# Patient Record
Sex: FEMALE | Race: WHITE | NOT HISPANIC OR LATINO
[De-identification: names, ages, dates, MRNs, and addresses within clinical notes are randomized per-mention and may not be internally consistent; named-entity substitution may affect disease eponyms.]

---

## 2018-03-13 ENCOUNTER — RX ONLY (RX ONLY)
Age: 58
End: 2018-03-13

## 2021-07-12 ENCOUNTER — OFFICE VISIT (OUTPATIENT)
Dept: URBAN - METROPOLITAN AREA CLINIC 51 | Facility: CLINIC | Age: 61
End: 2021-07-12
Payer: COMMERCIAL

## 2021-07-12 DIAGNOSIS — H35.031 HYPERTENSIVE RETINOPATHY, RIGHT EYE: ICD-10-CM

## 2021-07-12 DIAGNOSIS — H40.053 OCULAR HYPERTENSION, BILATERAL: Primary | ICD-10-CM

## 2021-07-12 DIAGNOSIS — H52.4 PRESBYOPIA: ICD-10-CM

## 2021-07-12 DIAGNOSIS — H43.313 VITREOUS MEMBRANES AND STRANDS, BILATERAL: ICD-10-CM

## 2021-07-12 DIAGNOSIS — H35.372 PUCKERING OF MACULA, LEFT EYE: ICD-10-CM

## 2021-07-12 PROCEDURE — 92250 FUNDUS PHOTOGRAPHY W/I&R: CPT | Performed by: OPTOMETRIST

## 2021-07-12 PROCEDURE — 92134 CPTRZ OPH DX IMG PST SGM RTA: CPT | Performed by: OPTOMETRIST

## 2021-07-12 PROCEDURE — 92133 CPTRZD OPH DX IMG PST SGM ON: CPT | Performed by: OPTOMETRIST

## 2021-07-12 PROCEDURE — 76514 ECHO EXAM OF EYE THICKNESS: CPT | Performed by: OPTOMETRIST

## 2021-07-12 PROCEDURE — 99204 OFFICE O/P NEW MOD 45 MIN: CPT | Performed by: OPTOMETRIST

## 2021-07-12 ASSESSMENT — KERATOMETRY
OD: 43.55
OS: 43.65

## 2021-07-12 ASSESSMENT — VISUAL ACUITY
OS: 20/25
OD: 20/20

## 2021-07-12 ASSESSMENT — INTRAOCULAR PRESSURE
OD: 22
OS: 24

## 2021-07-12 NOTE — IMPRESSION/PLAN
Impression: Essential (primary) hypertension: I10. Plan: Recent started medication for HTN. Continue care with PCP.

## 2021-07-12 NOTE — IMPRESSION/PLAN
Impression: Ocular hypertension, bilateral: H40.053.
    (-) Family hx  Plan: IOPs today: 22/24 RNFL (07/12/21): WNL OU
FUNDUS PHOTOS TAKEN (07/12/21) PACH: 554/560 (average OU) Educated patient on condition and findings. No signs of glaucoma and no indication to start treatment at this time, monitor in 1 year: RNFL+ GCC, 24-2 HVF.

## 2021-07-12 NOTE — IMPRESSION/PLAN
Impression: Hypertensive retinopathy, right eye: H35.031. Plan: Superior temporal flame hemorrhages OD only. FUNDUS PHOTOS taken today. Patient educated on condition and findings. Patient reports just recently being started on BP medications. Discussed the importance of good BP control. Follow up with PCP for further BP control. Monitor annually with FUNDUS PHOTOS.

## 2021-07-12 NOTE — IMPRESSION/PLAN
Impression: Vitreous membranes and strands, bilateral: H43.313. Plan: Ed pt on process of vitreous syneresis. Assured patient that there is no signs of any retinal holes, tears or detachments. Pt to RTC ASAP if increase in floaters, flashes, or curtain or veil taking away vision.

## 2021-07-12 NOTE — IMPRESSION/PLAN
Impression: Puckering of macula, left eye: H35.372. Plan: ERM w/ partial hole OS. MAC OCT taken today. Educated patient on condition and findings. Mild impact on vision Amsler grid performed in office, and patient notes wavy/ distorted lines OS and OD (without glasses). Recommend see retina for further evaluation. RTC if notice changes in vision sooner. Monitor with MAC OCT.